# Patient Record
Sex: MALE | Race: WHITE | NOT HISPANIC OR LATINO | URBAN - METROPOLITAN AREA
[De-identification: names, ages, dates, MRNs, and addresses within clinical notes are randomized per-mention and may not be internally consistent; named-entity substitution may affect disease eponyms.]

---

## 2023-05-10 ENCOUNTER — LAB (OUTPATIENT)
Dept: LAB | Facility: LAB | Age: 43
End: 2023-05-10
Payer: COMMERCIAL

## 2023-05-10 ENCOUNTER — OFFICE VISIT (OUTPATIENT)
Dept: PRIMARY CARE | Facility: CLINIC | Age: 43
End: 2023-05-10
Payer: COMMERCIAL

## 2023-05-10 VITALS
HEART RATE: 74 BPM | DIASTOLIC BLOOD PRESSURE: 84 MMHG | SYSTOLIC BLOOD PRESSURE: 126 MMHG | HEIGHT: 70 IN | BODY MASS INDEX: 27.35 KG/M2 | WEIGHT: 191 LBS

## 2023-05-10 DIAGNOSIS — Z00.00 HEALTHCARE MAINTENANCE: ICD-10-CM

## 2023-05-10 DIAGNOSIS — Z00.00 HEALTHCARE MAINTENANCE: Primary | ICD-10-CM

## 2023-05-10 DIAGNOSIS — M79.671 HEEL PAIN, BILATERAL: ICD-10-CM

## 2023-05-10 DIAGNOSIS — M79.672 HEEL PAIN, BILATERAL: ICD-10-CM

## 2023-05-10 DIAGNOSIS — R53.83 OTHER FATIGUE: ICD-10-CM

## 2023-05-10 DIAGNOSIS — M65.4 DE QUERVAIN'S TENOSYNOVITIS, LEFT: ICD-10-CM

## 2023-05-10 PROBLEM — N50.89 LUMP IN SCROTUM: Status: RESOLVED | Noted: 2023-05-10 | Resolved: 2023-05-10

## 2023-05-10 PROBLEM — F43.9 STRESS: Status: RESOLVED | Noted: 2023-05-10 | Resolved: 2023-05-10

## 2023-05-10 PROBLEM — H61.23 BILATERAL IMPACTED CERUMEN: Status: ACTIVE | Noted: 2023-05-10

## 2023-05-10 LAB
ALANINE AMINOTRANSFERASE (SGPT) (U/L) IN SER/PLAS: 26 U/L (ref 10–52)
ALBUMIN (G/DL) IN SER/PLAS: 4.7 G/DL (ref 3.4–5)
ALKALINE PHOSPHATASE (U/L) IN SER/PLAS: 63 U/L (ref 33–120)
ANION GAP IN SER/PLAS: 11 MMOL/L (ref 10–20)
ASPARTATE AMINOTRANSFERASE (SGOT) (U/L) IN SER/PLAS: 22 U/L (ref 9–39)
BASOPHILS (10*3/UL) IN BLOOD BY AUTOMATED COUNT: 0.05 X10E9/L (ref 0–0.1)
BASOPHILS/100 LEUKOCYTES IN BLOOD BY AUTOMATED COUNT: 1 % (ref 0–2)
BILIRUBIN TOTAL (MG/DL) IN SER/PLAS: 0.7 MG/DL (ref 0–1.2)
CALCIDIOL (25 OH VITAMIN D3) (NG/ML) IN SER/PLAS: 16 NG/ML
CALCIUM (MG/DL) IN SER/PLAS: 9.9 MG/DL (ref 8.6–10.3)
CARBON DIOXIDE, TOTAL (MMOL/L) IN SER/PLAS: 31 MMOL/L (ref 21–32)
CHLORIDE (MMOL/L) IN SER/PLAS: 101 MMOL/L (ref 98–107)
CHOLESTEROL (MG/DL) IN SER/PLAS: 199 MG/DL (ref 0–199)
CHOLESTEROL IN HDL (MG/DL) IN SER/PLAS: 58.3 MG/DL
CHOLESTEROL/HDL RATIO: 3.4
COBALAMIN (VITAMIN B12) (PG/ML) IN SER/PLAS: 243 PG/ML (ref 211–911)
CREATININE (MG/DL) IN SER/PLAS: 1.12 MG/DL (ref 0.5–1.3)
EOSINOPHILS (10*3/UL) IN BLOOD BY AUTOMATED COUNT: 0.12 X10E9/L (ref 0–0.7)
EOSINOPHILS/100 LEUKOCYTES IN BLOOD BY AUTOMATED COUNT: 2.4 % (ref 0–6)
ERYTHROCYTE DISTRIBUTION WIDTH (RATIO) BY AUTOMATED COUNT: 12.8 % (ref 11.5–14.5)
ERYTHROCYTE MEAN CORPUSCULAR HEMOGLOBIN CONCENTRATION (G/DL) BY AUTOMATED: 33.1 G/DL (ref 32–36)
ERYTHROCYTE MEAN CORPUSCULAR VOLUME (FL) BY AUTOMATED COUNT: 88 FL (ref 80–100)
ERYTHROCYTES (10*6/UL) IN BLOOD BY AUTOMATED COUNT: 5.57 X10E12/L (ref 4.5–5.9)
ESTIMATED AVERAGE GLUCOSE FOR HBA1C: 117 MG/DL
GFR MALE: 84 ML/MIN/1.73M2
GLUCOSE (MG/DL) IN SER/PLAS: 97 MG/DL (ref 74–99)
HEMATOCRIT (%) IN BLOOD BY AUTOMATED COUNT: 48.9 % (ref 41–52)
HEMOGLOBIN (G/DL) IN BLOOD: 16.2 G/DL (ref 13.5–17.5)
HEMOGLOBIN A1C/HEMOGLOBIN TOTAL IN BLOOD: 5.7 %
IMMATURE GRANULOCYTES/100 LEUKOCYTES IN BLOOD BY AUTOMATED COUNT: 0.4 % (ref 0–0.9)
LDL: 118 MG/DL (ref 0–99)
LEUKOCYTES (10*3/UL) IN BLOOD BY AUTOMATED COUNT: 5.1 X10E9/L (ref 4.4–11.3)
LYMPHOCYTES (10*3/UL) IN BLOOD BY AUTOMATED COUNT: 1.59 X10E9/L (ref 1.2–4.8)
LYMPHOCYTES/100 LEUKOCYTES IN BLOOD BY AUTOMATED COUNT: 31.4 % (ref 13–44)
MONOCYTES (10*3/UL) IN BLOOD BY AUTOMATED COUNT: 0.69 X10E9/L (ref 0.1–1)
MONOCYTES/100 LEUKOCYTES IN BLOOD BY AUTOMATED COUNT: 13.6 % (ref 2–10)
NEUTROPHILS (10*3/UL) IN BLOOD BY AUTOMATED COUNT: 2.6 X10E9/L (ref 1.2–7.7)
NEUTROPHILS/100 LEUKOCYTES IN BLOOD BY AUTOMATED COUNT: 51.2 % (ref 40–80)
PLATELETS (10*3/UL) IN BLOOD AUTOMATED COUNT: 324 X10E9/L (ref 150–450)
POTASSIUM (MMOL/L) IN SER/PLAS: 4.5 MMOL/L (ref 3.5–5.3)
PROSTATE SPECIFIC AG (NG/ML) IN SER/PLAS: 0.55 NG/ML (ref 0–4)
PROTEIN TOTAL: 7.8 G/DL (ref 6.4–8.2)
SODIUM (MMOL/L) IN SER/PLAS: 138 MMOL/L (ref 136–145)
THYROTROPIN (MIU/L) IN SER/PLAS BY DETECTION LIMIT <= 0.05 MIU/L: 1 MIU/L (ref 0.44–3.98)
TRIGLYCERIDE (MG/DL) IN SER/PLAS: 112 MG/DL (ref 0–149)
UREA NITROGEN (MG/DL) IN SER/PLAS: 16 MG/DL (ref 6–23)
VLDL: 22 MG/DL (ref 0–40)

## 2023-05-10 PROCEDURE — 82607 VITAMIN B-12: CPT

## 2023-05-10 PROCEDURE — 36415 COLL VENOUS BLD VENIPUNCTURE: CPT

## 2023-05-10 PROCEDURE — 99396 PREV VISIT EST AGE 40-64: CPT | Performed by: STUDENT IN AN ORGANIZED HEALTH CARE EDUCATION/TRAINING PROGRAM

## 2023-05-10 PROCEDURE — 93000 ELECTROCARDIOGRAM COMPLETE: CPT | Performed by: STUDENT IN AN ORGANIZED HEALTH CARE EDUCATION/TRAINING PROGRAM

## 2023-05-10 PROCEDURE — 1036F TOBACCO NON-USER: CPT | Performed by: STUDENT IN AN ORGANIZED HEALTH CARE EDUCATION/TRAINING PROGRAM

## 2023-05-10 PROCEDURE — 80061 LIPID PANEL: CPT

## 2023-05-10 PROCEDURE — 82306 VITAMIN D 25 HYDROXY: CPT

## 2023-05-10 PROCEDURE — 99214 OFFICE O/P EST MOD 30 MIN: CPT | Performed by: STUDENT IN AN ORGANIZED HEALTH CARE EDUCATION/TRAINING PROGRAM

## 2023-05-10 PROCEDURE — 84443 ASSAY THYROID STIM HORMONE: CPT

## 2023-05-10 PROCEDURE — 84153 ASSAY OF PSA TOTAL: CPT

## 2023-05-10 PROCEDURE — 85025 COMPLETE CBC W/AUTO DIFF WBC: CPT

## 2023-05-10 PROCEDURE — 83036 HEMOGLOBIN GLYCOSYLATED A1C: CPT

## 2023-05-10 PROCEDURE — 80053 COMPREHEN METABOLIC PANEL: CPT

## 2023-05-10 NOTE — PROGRESS NOTES
Subjective   Patient ID: Jayy Barragan is a 42 y.o. male who presents for Annual Exam.  Today he is accompanied by alone.     HPI  Patient is presenting to the office today for a yearly physical examination    Health maintenance:  Overall patient is doing well.   Immunization: Tdap 2017; influenza October 2022; COVID-19 vaccinated  Colon Cancer Screening: No family history  Diet: Attempts to eat a well-balanced diet  Exercise: Exercises has been lacking recently   Tobacco: Denies use  EtOH: Rarely/socially  Denies any chest pain, palpitations, shortness of breath, cough, nausea, vomiting, diarrhea, or any other acute signs/symptoms  Fasting in preparation have lab work performed    L hand numbness/pain:   Noticed over L thumb that radiates up arm occasionally   Symptoms for last 18 months or so   Worse with certain movements or strain   Denies trauma of injury     Bilateral heel pain:  Over the last 8 months or so   Stiff and painful around base of heel   Has tried stretching   Worse at the end of the day   Better when he wears running shoes   Has been told he is flat footed, wears arches in running shoes     Fatigue:   Feels tired all the time   Inquiring about sea bonilla supplementation  Does has a 15 month old at home   Reports trouble keeping track of things over the last year       No current outpatient medications on file prior to visit.     No current facility-administered medications on file prior to visit.        Allergies   Allergen Reactions    Sulfa (Sulfonamide Antibiotics) Itching       Immunization History   Administered Date(s) Administered    Influenza, injectable, MDCK, preservative free, quadrivalent 10/09/2019    Influenza, injectable, quadrivalent 11/11/2021    Influenza, injectable, quadrivalent, preservative free 10/04/2022    Influenza, seasonal, injectable 09/30/2015    Influenza, seasonal, injectable, preservative free 10/07/2014, 10/12/2016    Pfizer Purple Cap SARS-CoV-2 04/08/2021,  "05/06/2021, 12/01/2021    Tdap 05/19/2014, 10/24/2017         Review of Systems  All pertinent positive symptoms are included in the history of present illness.  All other systems have been reviewed and are negative and noncontributory to this patient's current ailments.     Objective   /84 (BP Location: Left arm, Patient Position: Sitting, BP Cuff Size: Adult)   Pulse 74   Ht 1.772 m (5' 9.75\")   Wt 86.6 kg (191 lb)   BMI 27.60 kg/m²   BSA: 2.06 meters squared  No visits with results within 1 Month(s) from this visit.   Latest known visit with results is:   [facility] Encounter on 10/27/2020   Component Date Value Ref Range Status    WBC 10/27/2020 4.9  4.4 - 11.3 x10E9/L Final    nRBC 10/27/2020 0.0  0.0 - 0.0 /100 WBC Final    RBC 10/27/2020 5.48  4.50 - 5.90 x10E12/L Final    Hemoglobin 10/27/2020 16.0  13.5 - 17.5 g/dL Final    Hematocrit 10/27/2020 48.3  41.0 - 52.0 % Final    MCV 10/27/2020 88  80 - 100 fL Final    MCHC 10/27/2020 33.1  32.0 - 36.0 g/dL Final    Platelets 10/27/2020 333  150 - 450 x10E9/L Final    RDW 10/27/2020 12.2  11.5 - 14.5 % Final    Neutrophils % 10/27/2020 54.9  40.0 - 80.0 % Final    Immature Granulocytes %, Automated 10/27/2020 0.6  0.0 - 0.9 % Final    Comment:  Immature Granulocyte Count (IG) includes promyelocytes,    myelocytes and metamyelocytes but does not include bands.   Percent differential counts (%) should be interpreted in the   context of the absolute cell counts (cells/L).      Lymphocytes % 10/27/2020 29.1  13.0 - 44.0 % Final    Monocytes % 10/27/2020 11.7  2.0 - 10.0 % Final    Eosinophils % 10/27/2020 2.7  0.0 - 6.0 % Final    Basophils % 10/27/2020 1.0  0.0 - 2.0 % Final    Neutrophils Absolute 10/27/2020 2.68  1.20 - 7.70 x10E9/L Final    Lymphocytes Absolute 10/27/2020 1.42  1.20 - 4.80 x10E9/L Final    Monocytes Absolute 10/27/2020 0.57  0.10 - 1.00 x10E9/L Final    Eosinophils Absolute 10/27/2020 0.13  0.00 - 0.70 x10E9/L Final    Basophils Absolute " 10/27/2020 0.05  0.00 - 0.10 x10E9/L Final    TSH 10/27/2020 1.16  0.44 - 3.98 mIU/L Final    Comment:  TSH testing is performed using different testing    methodology at [facility] than at other    [facility]. Direct result comparisons should    only be made within the same method.      PSA 10/27/2020 0.45  0.00 - 4.00 ng/mL Final    Comment: The FDA requires that the method used for PSA assay be   reported to the physician. Values obtained with different   assay methods must not be used interchangeably. This test   was performed at [facility] using the [employer]ens  [employer] PSA method, which is a sandwich immunoassay using   chemiluminescence for quantitation. The assay is approved  for measurement of prostate-specific antigen (PSA) in   serum and may be used in conjunction with a digital rectal  examination in men 50 years and older as an aid in   detection of prostate cancer.   5-Alpha-reductase inhibitors (e.g. Proscar, Finasteride,   Avodart, Dutasteride and Mary) for the treatment of BPH   have been shown to lower PSA levels by an average of 50%   after 6 months of treatment.      Glucose 10/27/2020 95  74 - 99 mg/dL Final    Sodium 10/27/2020 140  136 - 145 mmol/L Final    Potassium 10/27/2020 4.3  3.5 - 5.3 mmol/L Final    Chloride 10/27/2020 103  98 - 107 mmol/L Final    Bicarbonate 10/27/2020 28  21 - 32 mmol/L Final    Anion Gap 10/27/2020 13  10 - 20 mmol/L Final    Urea Nitrogen 10/27/2020 17  6 - 23 mg/dL Final    Creatinine 10/27/2020 1.00  0.50 - 1.30 mg/dL Final    GLOMERULAR FILTRATION RATE-NON AFR* 10/27/2020 >60  >60 mL/min/1.73m2 Final    GLOMERULAR FILTRATION RATE-* 10/27/2020 >60  >60 mL/min/1.73m2 Final    Comment:  CALCULATIONS OF ESTIMATED GFR ARE PERFORMED   USING THE MDRD STUDY EQUATION FOR THE   IDMS-TRACEABLE CREATININE METHODS.   CLIN CHEM 2007;53:766-72      Calcium 10/27/2020 10.2  8.6 - 10.6 mg/dL Final    Albumin 10/27/2020 4.8  3.4 - 5.0  g/dL Final    Alkaline Phosphatase 10/27/2020 54  33 - 120 U/L Final    Total Protein 10/27/2020 7.4  6.4 - 8.2 g/dL Final    AST 10/27/2020 16  9 - 39 U/L Final    Total Bilirubin 10/27/2020 0.5  0.0 - 1.2 mg/dL Final    ALT (SGPT) 10/27/2020 14  10 - 52 U/L Final    Comment:  Patients treated with Sulfasalazine may generate    falsely decreased results for ALT.      Cholesterol 10/27/2020 205 (H)  0 - 199 mg/dL Final    Comment: .      AGE      DESIRABLE   BORDERLINE HIGH   HIGH     0-19 Y     0 - 169       170 - 199     >/= 200    20-24 Y     0 - 189       190 - 224     >/= 225         >24 Y     0 - 199       200 - 239     >/= 240   **All ranges are based on fasting samples. Specific   therapeutic targets will vary based on patient-specific   cardiac risk.  .   Pediatric guidelines reference:Pediatrics 2011, 128(S5).   Adult guidelines reference: NCEP ATPIII Guidelines,     DON 2001, 258:2486-97  .   Venipuncture immediately after or during the    administration of Metamizole may lead to falsely   low results. Testing should be performed immediately   prior to Metamizole dosing.      HDL 10/27/2020 59.5  mg/dL Final    Comment: .      AGE      VERY LOW   LOW     NORMAL    HIGH       0-19 Y       < 35   < 40     40-45     ----    20-24 Y       ----   < 40       >45     ----      >24 Y       ----   < 40     40-60      >60  .      Cholesterol/HDL Ratio 10/27/2020 3.4   Final    Comment: REF VALUES  DESIRABLE  < 3.4  HIGH RISK  > 5.0      LDL 10/27/2020 133 (H)  0 - 99 mg/dL Final    Comment: .                           NEAR      BORD      AGE      DESIRABLE  OPTIMAL    HIGH     HIGH     VERY HIGH     0-19 Y     0 - 109     ---    110-129   >/= 130     ----    20-24 Y     0 - 119     ---    120-159   >/= 160     ----      >24 Y     0 -  99   100-129  130-159   160-189     >/=190  .      VLDL 10/27/2020 13  0 - 40 mg/dL Final    Triglycerides 10/27/2020 64  0 - 149 mg/dL Final    Comment: .      AGE      DESIRABLE    BORDERLINE HIGH   HIGH     VERY HIGH   0 D-90 D    19 - 174         ----         ----        ----  91 D- 9 Y     0 -  74        75 -  99     >/= 100      ----    10-19 Y     0 -  89        90 - 129     >/= 130      ----    20-24 Y     0 - 114       115 - 149     >/= 150      ----         >24 Y     0 - 149       150 - 199    200- 499    >/= 500  .   Venipuncture immediately after or during the    administration of Metamizole may lead to falsely   low results. Testing should be performed immediately   prior to Metamizole dosing.         Physical Exam  CONSTITUTIONAL - well nourished, well developed, looks like stated age, in no acute distress, not ill-appearing, and not tired appearing  SKIN - normal skin color and pigmentation, normal skin turgor without rash, lesions, or nodules visualized  HEAD - no trauma, normocephalic  EYES - normal external exam  ENT - TM's intact, no injection, no signs of infection, uvula midline, normal tongue movement and throat normal, no exudate, nasal passage without discharge and patent  NECK - supple without rigidity, no neck mass was observed, no thyromegaly or thyroid nodules  CHEST - clear to auscultation, no wheezing, no crackles and no rales, good effort  CARDIAC - regular rate and regular rhythm, no skipped beats, no murmur  ABDOMEN - no organomegaly, soft, nontender, nondistended, normal bowel sounds, no guarding/rebound/rigidity, negative McBurney sign and negative Rust sign  EXTREMITIES - no edema, no deformities  NEUROLOGICAL - normal gait, normal balance, normal motor, no ataxia  PSYCHIATRIC - alert, pleasant and cordial, age-appropriate  IMMUNOLOGIC - no cervical lymphadenopathy     Assessment/Plan   1.  Health maintenance  Complete history and physical examination was performed  EKG reveals normal sinus rhythm without acute changes  We will notify of test results once available and make treatment recommendations accordingly  Please attempt to eat a well-balanced diet and  exercise regularly    2. DeQuervain's tenosynovitis, left  Recommended thumb spica splint   Discussed referral to ortho hand surgeon for consideration of steroid injection     3. Bilateral heel pain   Likely plantar fascitis, discussed use of supportive footwear and wearing your recommended arch support with you work boots.   Discussed home stretches and exercises   Consider podiatry referral if symptoms worsen or fail to improve.      4. Fatigue   Will plan to check vitamin D and B12 in addition to blood count and thyroid function with lab work today. Will notify of test result and make recommendations accordingly.

## 2023-05-11 ENCOUNTER — TELEPHONE (OUTPATIENT)
Dept: PRIMARY CARE | Facility: CLINIC | Age: 43
End: 2023-05-11
Payer: COMMERCIAL

## 2023-05-11 NOTE — RESULT ENCOUNTER NOTE
Hemoglobin A1c shows prediabetes at 5.7%    Vitamin D is low at 16 so I would recommend supplementation    Cholesterol looks good at 199, HDL 58, , triglycerides 112    Sugar, kidneys, liver, electrolytes are all within normal limits    Vitamin B12 well within normal limits at 243    Thyroid and PSA/prostate well within normal limits    Complete blood cell count shows no anemia

## 2023-05-11 NOTE — TELEPHONE ENCOUNTER
----- Message from Ayad Estrada, DO sent at 5/11/2023  6:26 AM EDT -----  Hemoglobin A1c shows prediabetes at 5.7%    Vitamin D is low at 16 so I would recommend supplementation    Cholesterol looks good at 199, HDL 58, , triglycerides 112    Sugar, kidneys, liver, electrolytes are all within normal limits    Vitamin B12 well within normal limits at 243    Thyroid and PSA/prostate well within normal limits    Complete blood cell count shows no anemia

## 2025-09-09 ENCOUNTER — APPOINTMENT (OUTPATIENT)
Dept: PRIMARY CARE | Facility: CLINIC | Age: 45
End: 2025-09-09
Payer: COMMERCIAL